# Patient Record
Sex: FEMALE | Race: WHITE | NOT HISPANIC OR LATINO | Employment: UNEMPLOYED | URBAN - METROPOLITAN AREA
[De-identification: names, ages, dates, MRNs, and addresses within clinical notes are randomized per-mention and may not be internally consistent; named-entity substitution may affect disease eponyms.]

---

## 2018-11-07 LAB
EXTERNAL ABO GROUPING: NORMAL
EXTERNAL ANTIBODY SCREEN: NEGATIVE
EXTERNAL HEPATITIS B SURFACE ANTIGEN: NEGATIVE
EXTERNAL HEPATITIS C AB: NORMAL
EXTERNAL RH FACTOR: POSITIVE
EXTERNAL RUBELLA QUALITATIVE: NORMAL
EXTERNAL SYPHILIS RPR SCREEN: NEGATIVE
HIV1 P24 AG SERPL QL IA: NORMAL

## 2019-06-06 LAB
EXTERNAL GROUP B STREP ANTIGEN: NEGATIVE
EXTERNAL GROUP B STREP ANTIGEN: NEGATIVE

## 2019-07-02 ENCOUNTER — HOSPITAL ENCOUNTER (INPATIENT)
Facility: HOSPITAL | Age: 24
LOS: 3 days | Discharge: HOME | End: 2019-07-05
Attending: OBSTETRICS & GYNECOLOGY | Admitting: OBSTETRICS & GYNECOLOGY

## 2019-07-02 PROBLEM — Z34.90 PREGNANCY: Status: ACTIVE | Noted: 2019-07-02

## 2019-07-02 LAB
ABO GROUP BLD: NORMAL
BASOPHILS # BLD AUTO: 0.04 10*3/MM3 (ref 0–0.2)
BASOPHILS NFR BLD AUTO: 0.3 % (ref 0–1.5)
BLD GP AB SCN SERPL QL: NEGATIVE
DEPRECATED RDW RBC AUTO: 44.2 FL (ref 37–54)
EOSINOPHIL # BLD AUTO: 0.1 10*3/MM3 (ref 0–0.4)
EOSINOPHIL NFR BLD AUTO: 0.9 % (ref 0.3–6.2)
ERYTHROCYTE [DISTWIDTH] IN BLOOD BY AUTOMATED COUNT: 13 % (ref 12.3–15.4)
HCT VFR BLD AUTO: 37.2 % (ref 34–46.6)
HGB BLD-MCNC: 12 G/DL (ref 12–15.9)
IMM GRANULOCYTES # BLD AUTO: 0.11 10*3/MM3 (ref 0–0.05)
IMM GRANULOCYTES NFR BLD AUTO: 0.9 % (ref 0–0.5)
LYMPHOCYTES # BLD AUTO: 2.14 10*3/MM3 (ref 0.7–3.1)
LYMPHOCYTES NFR BLD AUTO: 18.4 % (ref 19.6–45.3)
MCH RBC QN AUTO: 30.2 PG (ref 26.6–33)
MCHC RBC AUTO-ENTMCNC: 32.3 G/DL (ref 31.5–35.7)
MCV RBC AUTO: 93.5 FL (ref 79–97)
MONOCYTES # BLD AUTO: 0.6 10*3/MM3 (ref 0.1–0.9)
MONOCYTES NFR BLD AUTO: 5.2 % (ref 5–12)
NEUTROPHILS # BLD AUTO: 8.62 10*3/MM3 (ref 1.7–7)
NEUTROPHILS NFR BLD AUTO: 74.3 % (ref 42.7–76)
NRBC BLD AUTO-RTO: 0.1 /100 WBC (ref 0–0.2)
PLATELET # BLD AUTO: 150 10*3/MM3 (ref 140–450)
PMV BLD AUTO: 9.9 FL (ref 6–12)
RBC # BLD AUTO: 3.98 10*6/MM3 (ref 3.77–5.28)
RH BLD: POSITIVE
T&S EXPIRATION DATE: NORMAL
WBC NRBC COR # BLD: 11.61 10*3/MM3 (ref 3.4–10.8)

## 2019-07-02 PROCEDURE — 85025 COMPLETE CBC W/AUTO DIFF WBC: CPT | Performed by: OBSTETRICS & GYNECOLOGY

## 2019-07-02 PROCEDURE — 86901 BLOOD TYPING SEROLOGIC RH(D): CPT | Performed by: OBSTETRICS & GYNECOLOGY

## 2019-07-02 PROCEDURE — 86850 RBC ANTIBODY SCREEN: CPT | Performed by: OBSTETRICS & GYNECOLOGY

## 2019-07-02 PROCEDURE — 86900 BLOOD TYPING SEROLOGIC ABO: CPT | Performed by: OBSTETRICS & GYNECOLOGY

## 2019-07-02 PROCEDURE — 3E0P7VZ INTRODUCTION OF HORMONE INTO FEMALE REPRODUCTIVE, VIA NATURAL OR ARTIFICIAL OPENING: ICD-10-PCS | Performed by: OBSTETRICS & GYNECOLOGY

## 2019-07-02 RX ORDER — SODIUM CHLORIDE 0.9 % (FLUSH) 0.9 %
3 SYRINGE (ML) INJECTION EVERY 12 HOURS SCHEDULED
Status: DISCONTINUED | OUTPATIENT
Start: 2019-07-02 | End: 2019-07-03 | Stop reason: HOSPADM

## 2019-07-02 RX ORDER — OXYTOCIN-SODIUM CHLORIDE 0.9% IV SOLN 30 UNIT/500ML 30-0.9/5 UT/ML-%
2-30 SOLUTION INTRAVENOUS
Status: DISCONTINUED | OUTPATIENT
Start: 2019-07-02 | End: 2019-07-02

## 2019-07-02 RX ORDER — SODIUM CHLORIDE, SODIUM LACTATE, POTASSIUM CHLORIDE, CALCIUM CHLORIDE 600; 310; 30; 20 MG/100ML; MG/100ML; MG/100ML; MG/100ML
125 INJECTION, SOLUTION INTRAVENOUS CONTINUOUS
Status: DISCONTINUED | OUTPATIENT
Start: 2019-07-02 | End: 2019-07-03

## 2019-07-02 RX ORDER — LIDOCAINE HYDROCHLORIDE 10 MG/ML
5 INJECTION, SOLUTION EPIDURAL; INFILTRATION; INTRACAUDAL; PERINEURAL AS NEEDED
Status: DISCONTINUED | OUTPATIENT
Start: 2019-07-02 | End: 2019-07-03 | Stop reason: HOSPADM

## 2019-07-02 RX ORDER — FAMOTIDINE 10 MG/ML
20 INJECTION, SOLUTION INTRAVENOUS EVERY 12 HOURS SCHEDULED
Status: DISCONTINUED | OUTPATIENT
Start: 2019-07-02 | End: 2019-07-03 | Stop reason: HOSPADM

## 2019-07-02 RX ORDER — FAMOTIDINE 20 MG/1
20 TABLET, FILM COATED ORAL EVERY 12 HOURS SCHEDULED
Status: DISCONTINUED | OUTPATIENT
Start: 2019-07-02 | End: 2019-07-03 | Stop reason: HOSPADM

## 2019-07-02 RX ORDER — PRENATAL VIT NO.126/IRON/FOLIC 28MG-0.8MG
TABLET ORAL DAILY
COMMUNITY

## 2019-07-02 RX ORDER — OXYTOCIN-SODIUM CHLORIDE 0.9% IV SOLN 30 UNIT/500ML 30-0.9/5 UT/ML-%
2-20 SOLUTION INTRAVENOUS
Status: DISCONTINUED | OUTPATIENT
Start: 2019-07-03 | End: 2019-07-03

## 2019-07-02 RX ORDER — SODIUM CHLORIDE 0.9 % (FLUSH) 0.9 %
3-10 SYRINGE (ML) INJECTION AS NEEDED
Status: DISCONTINUED | OUTPATIENT
Start: 2019-07-02 | End: 2019-07-03 | Stop reason: HOSPADM

## 2019-07-02 RX ADMIN — SODIUM CHLORIDE, POTASSIUM CHLORIDE, SODIUM LACTATE AND CALCIUM CHLORIDE 75 ML/HR: 600; 310; 30; 20 INJECTION, SOLUTION INTRAVENOUS at 19:15

## 2019-07-02 RX ADMIN — DINOPROSTONE 10 MG: 10 INSERT VAGINAL at 11:15

## 2019-07-02 RX ADMIN — SODIUM CHLORIDE, POTASSIUM CHLORIDE, SODIUM LACTATE AND CALCIUM CHLORIDE 125 ML/HR: 600; 310; 30; 20 INJECTION, SOLUTION INTRAVENOUS at 10:25

## 2019-07-02 NOTE — PLAN OF CARE
Problem: Patient Care Overview  Goal: Plan of Care Review  Outcome: Ongoing (interventions implemented as appropriate)   07/02/19 1713   Coping/Psychosocial   Plan of Care Reviewed With patient   Plan of Care Review   Progress no change   OTHER   Outcome Summary patient sent down from office for 4/8 on BPP. Pt had cervidil placed at 1115. Pt resting comfortably. Plan to remove cervidil at 12 hours and start pitocin      Goal: Individualization and Mutuality  Outcome: Ongoing (interventions implemented as appropriate)   07/02/19 1713   Individualization   Patient Specific Preferences Healthy mom and baby   Patient Specific Goals (Include Timeframe) RAMSEY, vaginal delivery, breastfeed   Patient Specific Interventions cervidil for 12 hours, pitocin to follow   Mutuality/Individual Preferences   What Anxieties, Fears, Concerns, or Questions Do You Have About Your Care? first baby, pt states she is excited and nervous about what to expect   How Would You and/or Your Support Person Like to Participate in Your Care?  is shakir     Goal: Discharge Needs Assessment  Outcome: Ongoing (interventions implemented as appropriate)   07/02/19 1713   Discharge Needs Assessment   Readmission Within the Last 30 Days no previous admission in last 30 days       Problem: Labor (Cervical Ripen, Induct, Augment) (Adult,Obstetrics,Pediatric)  Goal: Signs and Symptoms of Listed Potential Problems Will be Absent, Minimized or Managed (Labor)  Outcome: Ongoing (interventions implemented as appropriate)   07/02/19 1713   Goal/Outcome Evaluation   Problems Assessed (Labor) all   Problems Present (Labor) none

## 2019-07-02 NOTE — NURSING NOTE
Dr. Kavita Yeager visualized deceleration. O2 on the patient, bolus going. Will pull cervidil for any subsequent decelerations.

## 2019-07-02 NOTE — NURSING NOTE
Spoke to Dr. Yeager. Updated on patient and why she is here. Pt sent over from office for 4/8 on BPP to be induced. Discussed SVE. MD states she wants to start cervical ripening. Cervidil to be placed now and removed in 12 hours unless labor starts before. Ctx every 2-5, pt unaware of contractions. Ctx palpate mild.

## 2019-07-03 ENCOUNTER — ANESTHESIA EVENT (OUTPATIENT)
Dept: LABOR AND DELIVERY | Facility: HOSPITAL | Age: 24
End: 2019-07-03

## 2019-07-03 ENCOUNTER — ANESTHESIA (OUTPATIENT)
Dept: LABOR AND DELIVERY | Facility: HOSPITAL | Age: 24
End: 2019-07-03

## 2019-07-03 PROCEDURE — 3E033VJ INTRODUCTION OF OTHER HORMONE INTO PERIPHERAL VEIN, PERCUTANEOUS APPROACH: ICD-10-PCS | Performed by: OBSTETRICS & GYNECOLOGY

## 2019-07-03 PROCEDURE — 10907ZC DRAINAGE OF AMNIOTIC FLUID, THERAPEUTIC FROM PRODUCTS OF CONCEPTION, VIA NATURAL OR ARTIFICIAL OPENING: ICD-10-PCS | Performed by: OBSTETRICS & GYNECOLOGY

## 2019-07-03 PROCEDURE — 25010000002 ROPIVACAINE PER 1 MG: Performed by: ANESTHESIOLOGY

## 2019-07-03 PROCEDURE — C1755 CATHETER, INTRASPINAL: HCPCS

## 2019-07-03 PROCEDURE — C1755 CATHETER, INTRASPINAL: HCPCS | Performed by: ANESTHESIOLOGY

## 2019-07-03 PROCEDURE — 0KQM0ZZ REPAIR PERINEUM MUSCLE, OPEN APPROACH: ICD-10-PCS | Performed by: OBSTETRICS & GYNECOLOGY

## 2019-07-03 RX ORDER — IBUPROFEN 800 MG/1
800 TABLET ORAL EVERY 8 HOURS PRN
Status: DISCONTINUED | OUTPATIENT
Start: 2019-07-03 | End: 2019-07-05 | Stop reason: HOSPADM

## 2019-07-03 RX ORDER — ONDANSETRON 2 MG/ML
4 INJECTION INTRAMUSCULAR; INTRAVENOUS ONCE AS NEEDED
Status: DISCONTINUED | OUTPATIENT
Start: 2019-07-03 | End: 2019-07-03 | Stop reason: HOSPADM

## 2019-07-03 RX ORDER — DIPHENHYDRAMINE HCL 25 MG
25 CAPSULE ORAL EVERY 6 HOURS PRN
Status: DISCONTINUED | OUTPATIENT
Start: 2019-07-03 | End: 2019-07-03 | Stop reason: HOSPADM

## 2019-07-03 RX ORDER — PHYTONADIONE 1 MG/.5ML
INJECTION, EMULSION INTRAMUSCULAR; INTRAVENOUS; SUBCUTANEOUS
Status: DISPENSED
Start: 2019-07-03 | End: 2019-07-04

## 2019-07-03 RX ORDER — ACETAMINOPHEN 325 MG/1
650 TABLET ORAL EVERY 6 HOURS PRN
Status: DISCONTINUED | OUTPATIENT
Start: 2019-07-03 | End: 2019-07-03

## 2019-07-03 RX ORDER — LANOLIN
CREAM (ML) TOPICAL
Status: DISCONTINUED | OUTPATIENT
Start: 2019-07-03 | End: 2019-07-05 | Stop reason: HOSPADM

## 2019-07-03 RX ORDER — PROMETHAZINE HYDROCHLORIDE 25 MG/ML
12.5 INJECTION, SOLUTION INTRAMUSCULAR; INTRAVENOUS EVERY 6 HOURS PRN
Status: DISCONTINUED | OUTPATIENT
Start: 2019-07-03 | End: 2019-07-05 | Stop reason: HOSPADM

## 2019-07-03 RX ORDER — OXYTOCIN-SODIUM CHLORIDE 0.9% IV SOLN 30 UNIT/500ML 30-0.9/5 UT/ML-%
250 SOLUTION INTRAVENOUS CONTINUOUS
Status: DISPENSED | OUTPATIENT
Start: 2019-07-03 | End: 2019-07-03

## 2019-07-03 RX ORDER — DOCUSATE SODIUM 100 MG/1
100 CAPSULE, LIQUID FILLED ORAL 2 TIMES DAILY
Status: DISCONTINUED | OUTPATIENT
Start: 2019-07-03 | End: 2019-07-05 | Stop reason: HOSPADM

## 2019-07-03 RX ORDER — OXYCODONE AND ACETAMINOPHEN 10; 325 MG/1; MG/1
1 TABLET ORAL EVERY 4 HOURS PRN
Status: DISCONTINUED | OUTPATIENT
Start: 2019-07-03 | End: 2019-07-05 | Stop reason: HOSPADM

## 2019-07-03 RX ORDER — OXYCODONE HYDROCHLORIDE AND ACETAMINOPHEN 5; 325 MG/1; MG/1
1 TABLET ORAL EVERY 4 HOURS PRN
Status: DISCONTINUED | OUTPATIENT
Start: 2019-07-03 | End: 2019-07-05 | Stop reason: HOSPADM

## 2019-07-03 RX ORDER — METHYLERGONOVINE MALEATE 0.2 MG/ML
200 INJECTION INTRAVENOUS ONCE AS NEEDED
Status: DISCONTINUED | OUTPATIENT
Start: 2019-07-03 | End: 2019-07-03 | Stop reason: HOSPADM

## 2019-07-03 RX ORDER — ONDANSETRON 2 MG/ML
4 INJECTION INTRAMUSCULAR; INTRAVENOUS EVERY 8 HOURS PRN
Status: DISCONTINUED | OUTPATIENT
Start: 2019-07-03 | End: 2019-07-05 | Stop reason: HOSPADM

## 2019-07-03 RX ORDER — CARBOPROST TROMETHAMINE 250 UG/ML
250 INJECTION, SOLUTION INTRAMUSCULAR AS NEEDED
Status: DISCONTINUED | OUTPATIENT
Start: 2019-07-03 | End: 2019-07-03 | Stop reason: HOSPADM

## 2019-07-03 RX ORDER — EPHEDRINE SULFATE 50 MG/ML
5 INJECTION, SOLUTION INTRAVENOUS AS NEEDED
Status: DISCONTINUED | OUTPATIENT
Start: 2019-07-03 | End: 2019-07-03 | Stop reason: HOSPADM

## 2019-07-03 RX ORDER — FAMOTIDINE 10 MG/ML
20 INJECTION, SOLUTION INTRAVENOUS ONCE AS NEEDED
Status: DISCONTINUED | OUTPATIENT
Start: 2019-07-03 | End: 2019-07-03 | Stop reason: HOSPADM

## 2019-07-03 RX ORDER — ERYTHROMYCIN 5 MG/G
OINTMENT OPHTHALMIC
Status: DISPENSED
Start: 2019-07-03 | End: 2019-07-04

## 2019-07-03 RX ORDER — PROMETHAZINE HYDROCHLORIDE 25 MG/1
25 TABLET ORAL EVERY 6 HOURS PRN
Status: DISCONTINUED | OUTPATIENT
Start: 2019-07-03 | End: 2019-07-05 | Stop reason: HOSPADM

## 2019-07-03 RX ORDER — OXYTOCIN-SODIUM CHLORIDE 0.9% IV SOLN 30 UNIT/500ML 30-0.9/5 UT/ML-%
125 SOLUTION INTRAVENOUS CONTINUOUS PRN
Status: COMPLETED | OUTPATIENT
Start: 2019-07-03 | End: 2019-07-03

## 2019-07-03 RX ORDER — OXYTOCIN-SODIUM CHLORIDE 0.9% IV SOLN 30 UNIT/500ML 30-0.9/5 UT/ML-%
999 SOLUTION INTRAVENOUS ONCE
Status: COMPLETED | OUTPATIENT
Start: 2019-07-03 | End: 2019-07-03

## 2019-07-03 RX ORDER — ONDANSETRON 4 MG/1
4 TABLET, FILM COATED ORAL EVERY 8 HOURS PRN
Status: DISCONTINUED | OUTPATIENT
Start: 2019-07-03 | End: 2019-07-05 | Stop reason: HOSPADM

## 2019-07-03 RX ORDER — MISOPROSTOL 200 UG/1
800 TABLET ORAL AS NEEDED
Status: DISCONTINUED | OUTPATIENT
Start: 2019-07-03 | End: 2019-07-03 | Stop reason: HOSPADM

## 2019-07-03 RX ORDER — MISOPROSTOL 200 UG/1
600 TABLET ORAL ONCE AS NEEDED
Status: DISCONTINUED | OUTPATIENT
Start: 2019-07-03 | End: 2019-07-05 | Stop reason: HOSPADM

## 2019-07-03 RX ORDER — PROMETHAZINE HYDROCHLORIDE 25 MG/1
12.5 SUPPOSITORY RECTAL EVERY 6 HOURS PRN
Status: DISCONTINUED | OUTPATIENT
Start: 2019-07-03 | End: 2019-07-05 | Stop reason: HOSPADM

## 2019-07-03 RX ORDER — HYDROXYZINE 50 MG/1
50 TABLET, FILM COATED ORAL NIGHTLY PRN
Status: DISCONTINUED | OUTPATIENT
Start: 2019-07-03 | End: 2019-07-05 | Stop reason: HOSPADM

## 2019-07-03 RX ORDER — ROPIVACAINE HYDROCHLORIDE 2 MG/ML
INJECTION, SOLUTION EPIDURAL; INFILTRATION; PERINEURAL AS NEEDED
Status: DISCONTINUED | OUTPATIENT
Start: 2019-07-03 | End: 2019-07-03 | Stop reason: SURG

## 2019-07-03 RX ORDER — CALCIUM CARBONATE 200(500)MG
2 TABLET,CHEWABLE ORAL 3 TIMES DAILY PRN
Status: DISCONTINUED | OUTPATIENT
Start: 2019-07-03 | End: 2019-07-05 | Stop reason: HOSPADM

## 2019-07-03 RX ORDER — LIDOCAINE HYDROCHLORIDE AND EPINEPHRINE 15; 5 MG/ML; UG/ML
INJECTION, SOLUTION EPIDURAL AS NEEDED
Status: DISCONTINUED | OUTPATIENT
Start: 2019-07-03 | End: 2019-07-03 | Stop reason: SURG

## 2019-07-03 RX ADMIN — OXYTOCIN 125 ML/HR: 10 INJECTION, SOLUTION INTRAMUSCULAR; INTRAVENOUS at 18:55

## 2019-07-03 RX ADMIN — Medication: at 20:29

## 2019-07-03 RX ADMIN — SODIUM CHLORIDE, POTASSIUM CHLORIDE, SODIUM LACTATE AND CALCIUM CHLORIDE 125 ML/HR: 600; 310; 30; 20 INJECTION, SOLUTION INTRAVENOUS at 14:36

## 2019-07-03 RX ADMIN — ROPIVACAINE HYDROCHLORIDE 10 ML: 2 INJECTION, SOLUTION EPIDURAL; INFILTRATION at 10:45

## 2019-07-03 RX ADMIN — Medication 10 ML/HR: at 10:48

## 2019-07-03 RX ADMIN — LIDOCAINE HYDROCHLORIDE AND EPINEPHRINE 3 ML: 15; 5 INJECTION, SOLUTION EPIDURAL at 10:40

## 2019-07-03 RX ADMIN — OXYTOCIN 999 ML/HR: 10 INJECTION INTRAVENOUS at 17:37

## 2019-07-03 RX ADMIN — SODIUM CHLORIDE, POTASSIUM CHLORIDE, SODIUM LACTATE AND CALCIUM CHLORIDE 999 ML/HR: 600; 310; 30; 20 INJECTION, SOLUTION INTRAVENOUS at 09:29

## 2019-07-03 RX ADMIN — ACETAMINOPHEN 650 MG: 325 TABLET, FILM COATED ORAL at 07:08

## 2019-07-03 RX ADMIN — SODIUM CHLORIDE, POTASSIUM CHLORIDE, SODIUM LACTATE AND CALCIUM CHLORIDE 125 ML/HR: 600; 310; 30; 20 INJECTION, SOLUTION INTRAVENOUS at 04:48

## 2019-07-03 RX ADMIN — IBUPROFEN 800 MG: 800 TABLET, FILM COATED ORAL at 20:29

## 2019-07-03 RX ADMIN — OXYTOCIN 2 MILLI-UNITS/MIN: 10 INJECTION INTRAVENOUS at 02:00

## 2019-07-03 RX ADMIN — DOCUSATE SODIUM 100 MG: 100 CAPSULE, LIQUID FILLED ORAL at 20:29

## 2019-07-03 RX ADMIN — Medication: at 20:28

## 2019-07-03 NOTE — ANESTHESIA PREPROCEDURE EVALUATION
Anesthesia Evaluation     Patient summary reviewed and Nursing notes reviewed                Airway   Mallampati: III  TM distance: >3 FB  Neck ROM: full  No difficulty expected  Dental - normal exam     Pulmonary - negative pulmonary ROS and normal exam   Cardiovascular - negative cardio ROS and normal exam        Neuro/Psych- negative ROS  GI/Hepatic/Renal/Endo - negative ROS     Musculoskeletal (-) negative ROS    Abdominal  - normal exam    Bowel sounds: normal.   Substance History - negative use     OB/GYN    (+) Pregnant,         Other                        Anesthesia Plan    ASA 2     epidural     Anesthetic plan, all risks, benefits, and alternatives have been provided, discussed and informed consent has been obtained with: patient.

## 2019-07-03 NOTE — PLAN OF CARE
Problem: Patient Care Overview  Goal: Plan of Care Review   07/03/19 0439   Coping/Psychosocial   Plan of Care Reviewed With patient;spouse   Plan of Care Review   Progress improving   OTHER   Outcome Summary Pt currently on pitocin, c/o pain rating it 2 (0-10) but tolerable, pt states she is coping at this time, spouse at bedside, all needs met per staff, no s/s of distress noted or voiced.

## 2019-07-03 NOTE — PLAN OF CARE
Problem: Patient Care Overview  Goal: Plan of Care Review  Outcome: Ongoing (interventions implemented as appropriate)   19   Coping/Psychosocial   Plan of Care Reviewed With patient;spouse   Plan of Care Review   Progress improving   OTHER   Outcome Summary  of viable baby girl. Apgars 9/9. Infant skin to skin. Fundus firm, midline, 2 below umbilicus, with scant amt of bleeding. Will transfer to postpartum when discharge criteria met     Goal: Individualization and Mutuality  Outcome: Ongoing (interventions implemented as appropriate)   19   Individualization   Patient Specific Preferences --  Breastfeeding and RAMSEY   Patient Specific Goals (Include Timeframe) --  successful breastfeeding before delivery   Patient Specific Interventions --  breastfeeding, and RAMSEY   Mutuality/Individual Preferences   What Anxieties, Fears, Concerns, or Questions Do You Have About Your Care? None at this time --    What Information Would Help Us Give You More Personalized Care? Keep updated with POC --    How Would You and/or Your Support Person Like to Participate in Your Care? RAMSEY, Breastfeed, bonding with  as much as possible --    Mutuality/Individual Preferences   How to Address Anxieties/Fears Keep updated with POC --      Goal: Discharge Needs Assessment  Outcome: Ongoing (interventions implemented as appropriate)   19   Discharge Needs Assessment   Readmission Within the Last 30 Days --  no previous admission in last 30 days   Concerns to be Addressed no discharge needs identified --    Patient/Family Anticipates Transition to home --    Patient/Family Anticipated Services at Transition none --    Transportation Concerns car, none --    Transportation Anticipated family or friend will provide --    Anticipated Changes Related to Illness none --    Equipment Needed After Discharge none --    Offered/Gave Vendor List no --    Disability   Equipment Currently  Used at Home none --        Problem: Fall Risk,  (Adult,Obstetrics,Pediatric)  Goal: Identify Related Risk Factors and Signs and Symptoms  Outcome: Ongoing (interventions implemented as appropriate)   19 1152   Fall Risk,  (Adult,Obstetrics,Pediatric)   Related Risk Factors (Fall Risk, ) regional anesthesia   Signs and Symptoms (Fall Risk, ) presence of fall risk factors     Goal: Absence of Maternal Fall  Outcome: Ongoing (interventions implemented as appropriate)   19 1152   Fall Risk,  (Adult,Obstetrics,Pediatric)   Absence of Maternal Fall making progress toward outcome       Problem: Anesthesia/Analgesia, Neuraxial (Obstetrics)  Goal: Signs and Symptoms of Listed Potential Problems Will be Absent, Minimized or Managed (Anesthesia/Analgesia, Neuraxial)  Outcome: Ongoing (interventions implemented as appropriate)   19 180   Goal/Outcome Evaluation   Problems Assessed (Neuraxial Anesthesia/Analgesia, OB) all   Problems Present (Neuraxial Anesth OB) none       Problem: Skin Injury Risk (Adult)  Goal: Identify Related Risk Factors and Signs and Symptoms  Outcome: Ongoing (interventions implemented as appropriate)   19 1152   Skin Injury Risk (Adult)   Related Risk Factors (Skin Injury Risk) medication;mobility impaired     Goal: Skin Health and Integrity  Outcome: Ongoing (interventions implemented as appropriate)   19 570   Skin Injury Risk (Adult)   Skin Health and Integrity making progress toward outcome

## 2019-07-03 NOTE — PLAN OF CARE
Problem: Patient Care Overview  Goal: Plan of Care Review  Outcome: Ongoing (interventions implemented as appropriate)   19 1152   Coping/Psychosocial   Plan of Care Reviewed With patient;spouse   Plan of Care Review   Progress improving   OTHER   Outcome Summary Pt comfortable with epidural. Pitocin infusing at 11mu/min. Category 1 FHR strip. Will continue with curent POC for      Goal: Individualization and Mutuality  Outcome: Ongoing (interventions implemented as appropriate)   19 0434   Individualization   Patient Specific Preferences Epidural, Breastfeed, RAMSEY   Patient Specific Goals (Include Timeframe) Vaginal delivery, effetive pain control while in labor   Patient Specific Interventions Epidural    Mutuality/Individual Preferences   What Anxieties, Fears, Concerns, or Questions Do You Have About Your Care? None at this time   What Information Would Help Us Give You More Personalized Care? Keep updated with POC   How Would You and/or Your Support Person Like to Participate in Your Care? RAMSEY, Breastfeed, bonding with  as much as possible   Mutuality/Individual Preferences   How to Address Anxieties/Fears Keep updated with POC     Goal: Discharge Needs Assessment  Outcome: Ongoing (interventions implemented as appropriate)   19 0434   Discharge Needs Assessment   Readmission Within the Last 30 Days no previous admission in last 30 days   Concerns to be Addressed no discharge needs identified   Patient/Family Anticipates Transition to home   Patient/Family Anticipated Services at Transition none   Transportation Concerns car, none   Transportation Anticipated family or friend will provide   Anticipated Changes Related to Illness none   Equipment Needed After Discharge none   Offered/Gave Vendor List no   Disability   Equipment Currently Used at Home none       Problem: Labor (Cervical Ripen, Induct, Augment) (Adult,Obstetrics,Pediatric)  Goal: Signs and Symptoms of Listed Potential  Problems Will be Absent, Minimized or Managed (Labor)  Outcome: Ongoing (interventions implemented as appropriate)   19 1152   Goal/Outcome Evaluation   Problems Assessed (Labor) all   Problems Present (Labor) none       Problem: Fall Risk,  (Adult,Obstetrics,Pediatric)  Goal: Identify Related Risk Factors and Signs and Symptoms  Outcome: Ongoing (interventions implemented as appropriate)   19 1152   Fall Risk,  (Adult,Obstetrics,Pediatric)   Related Risk Factors (Fall Risk, ) regional anesthesia   Signs and Symptoms (Fall Risk, ) presence of fall risk factors     Goal: Absence of Maternal Fall  Outcome: Ongoing (interventions implemented as appropriate)   19 1152   Fall Risk,  (Adult,Obstetrics,Pediatric)   Absence of Maternal Fall making progress toward outcome       Problem: Anesthesia/Analgesia, Neuraxial (Obstetrics)  Goal: Signs and Symptoms of Listed Potential Problems Will be Absent, Minimized or Managed (Anesthesia/Analgesia, Neuraxial)  Outcome: Ongoing (interventions implemented as appropriate)   19 1152   Goal/Outcome Evaluation   Problems Assessed (Neuraxial Anesthesia/Analgesia, OB) all   Problems Present (Neuraxial Anesth OB) none       Problem: Skin Injury Risk (Adult)  Goal: Identify Related Risk Factors and Signs and Symptoms  Outcome: Ongoing (interventions implemented as appropriate)   19 1152   Skin Injury Risk (Adult)   Related Risk Factors (Skin Injury Risk) medication;mobility impaired     Goal: Skin Health and Integrity  Outcome: Ongoing (interventions implemented as appropriate)   19 1152   Skin Injury Risk (Adult)   Skin Health and Integrity making progress toward outcome

## 2019-07-03 NOTE — ANESTHESIA PROCEDURE NOTES
Labor Epidural      Patient location during procedure: OB  Performed By  Anesthesiologist: Zeina Cotton MD  Preanesthetic Checklist  Completed: patient identified, site marked, surgical consent, pre-op evaluation, timeout performed, IV checked, risks and benefits discussed and monitors and equipment checked  Prep:  Pt Position:sitting  Sterile Tech:gloves, cap and sterile barrier  Monitoring:continuous pulse oximetry, EKG and blood pressure monitoring  Epidural Block Procedure:  Approach:midline  Location:L3-L4  Needle Type:Tuohy  Needle Gauge:18 G  Loss of Resistance Medium: air  Aspiration:negative  Test Dose:negative  Number of Attempts: 2  Post Assessment:  Dressing:secured with tape and occlusive dressing applied  Pt Tolerance:patient tolerated the procedure well with no apparent complications  Complications:no

## 2019-07-03 NOTE — H&P
[facility]  Obstetric History and Physical    Patient Name: Josue Rothman  :  1995  MRN:  [id number]      Chief Complaint   Patient presents with   • IOL     4/8 BPP, sent from office, positive fetal movement, no vaginal bleeding or LOF       Subjective     Patient is a 23 y.o. female  currently at 40w0d, who presents for IOL from the office.            Objective       Vital Signs Range for the last 24 hours  Temperature: Temp:  [97.9 °F (36.6 °C)-98.7 °F (37.1 °C)] 98.3 °F (36.8 °C)   Temp Source: Temp src: Oral   BP: BP: (100-134)/(61-84) 116/69   Pulse: Heart Rate:  [64-91] 86   Respirations: Resp:  [16-18] 18                   Physical Examination:     General :  Alert in NAD  Abdomen: Gravid, NT    Presentation: vtx   Cervix: Exam by: Method: sterile exam per RN   Dilation: Cervical Dilation (cm): 1   Effacement: Cervical Effacement: 50-60%   Station: Fetal Station: -2       Fetal Heart Rate Assessment   Method: Fetal HR Assessment Method: external   Beats/min: Fetal HR (beats/min): 110   Baseline: Fetal Heart Baseline Rate: normal range   Varibility: Fetal HR Variability: moderate (amplitude range 6 to 25 bpm)   Accels: Fetal HR Accelerations: greater than/equal to 15 bpm, lasting at least 15 seconds   Decels: Fetal HR Decelerations: absent   Tracing Category:       Uterine Assessment   Method: Method: external tocotransducer, palpation   Frequency (min): Contraction Frequency (Minutes): 2-5   Ctx Count in 10 min:     Duration:     Intensity: Contraction Intensity: moderate by palpation   Intensity by IUPC:     Resting Tone: Uterine Resting Tone: soft by palpation   Resting Tone by IUPC:     Herriman Units:           Results from last 7 days   Lab Units 19  1034   WBC 10*3/mm3 11.61*   HEMOGLOBIN g/dL 12.0   HEMATOCRIT % 37.2   PLATELETS 10*3/mm3 150       Assessment/Plan     1.  Intrauterine pregnancy at 40w0d weeks gestation for IOL at term with BPP 4/8   reactive fetal status.     Plan cervical rippening with Cervidil followed by Pitocin. Anticipate .  Plan of care has been reviewed with patient and family.  All questions answered.      Pregnancy        H&P updated. The patient was examined and the following changes are noted above.         Kavita Yeager MD  2019  11:18 PM

## 2019-07-03 NOTE — PLAN OF CARE
Problem: Patient Care Overview  Goal: Discharge Needs Assessment  Outcome: Ongoing (interventions implemented as appropriate)   19   Discharge Needs Assessment   Readmission Within the Last 30 Days no previous admission in last 30 days   Concerns to be Addressed no discharge needs identified   Patient/Family Anticipates Transition to home   Patient/Family Anticipated Services at Transition none   Transportation Concerns car, none   Transportation Anticipated family or friend will provide   Anticipated Changes Related to Illness none   Equipment Needed After Discharge none   Offered/Gave Vendor List no   Disability   Equipment Currently Used at Home none     Goal: Interprofessional Rounds/Family Conf  Outcome: Ongoing (interventions implemented as appropriate)   19   Interdisciplinary Rounds/Family Conf   Summary discussed POC, medication, and anticipated care of    Participants patient;nursing       Problem: Labor (Cervical Ripen, Induct, Augment) (Adult,Obstetrics,Pediatric)  Goal: Signs and Symptoms of Listed Potential Problems Will be Absent, Minimized or Managed (Labor)  Outcome: Ongoing (interventions implemented as appropriate)   19   Goal/Outcome Evaluation   Problems Assessed (Labor) all   Problems Present (Labor) none

## 2019-07-03 NOTE — PLAN OF CARE
Problem: Patient Care Overview  Goal: Individualization and Mutuality  Outcome: Ongoing (interventions implemented as appropriate)   19 0434   Individualization   Patient Specific Preferences Epidural, Breastfeed, RAMSEY   Patient Specific Goals (Include Timeframe) Vaginal delivery, effetive pain control while in labor   Patient Specific Interventions Epidural    Mutuality/Individual Preferences   What Anxieties, Fears, Concerns, or Questions Do You Have About Your Care? None at this time   What Information Would Help Us Give You More Personalized Care? Keep updated with POC   How Would You and/or Your Support Person Like to Participate in Your Care? RAMSEY, Breastfeed, bonding with  as much as possible   Mutuality/Individual Preferences   How to Address Anxieties/Fears Keep updated with POC

## 2019-07-03 NOTE — NURSING NOTE
Late Entry:    1839 - Pt on bedpan trying to void. Pt unable to void at this time. Perineum cleansed, witch hazel pads and safia-pad placed on perineum.

## 2019-07-03 NOTE — L&D DELIVERY NOTE
[facility]  Vaginal Delivery Note    Patient Name: Josue Rothman  :  1995  MRN:  [id number]      Delivery     Delivery: Vaginal, Spontaneous     YOB: 2020    Time of Birth: 5:34 PM      Anesthesia: Epidural     Delivering clinician: Amanda Bowen MD       Infant    Findings: Viable female  infant    Infant observations: Weight: 3746 g (8 lb 4.1 oz)   Observations/Comments:  scale 4      Apgars: 9   @ 1 minute /    9   @ 5 minutes     Placenta, Cord, and Fluid    Placenta delivered  Expressed   at  7/3/2019  5:37 PM     Cord: 3 vessels  present.   Cord blood obtained: Yes    Cord gases obtained:  No      Repair    Episiotomy: No   Lacerations: Second degree   Estimated Blood Loss: 300  mls.       Delivery narrative: The patient is a 23 y.o.  at 40w1d.  Presented for bpp .  She underwent cervidil and then pitocin without difficulty. arom with clear fluid performed at 2cm. Epidural given and worked well throughout. Baby tolerated labor wonderfully. Progressed normally to C/C/+1. . Fetal status reassuring throughout.  of viable female infant  @ 5:34 PM  over an intact perineum. ASDE. 3746 g (8 lb 4.1 oz) .  Placenta delivered spontaneously, intact with 3 vessel cord. Cervix and rectum intact. second degree laceration repaired in usual fashion with 3-0vicryl suture. Mother and baby recovering good condition.       Amanda Bowen MD  19  7:21 PM

## 2019-07-04 LAB
BASOPHILS # BLD AUTO: 0.04 10*3/MM3 (ref 0–0.2)
BASOPHILS NFR BLD AUTO: 0.3 % (ref 0–1.5)
DEPRECATED RDW RBC AUTO: 42.9 FL (ref 37–54)
EOSINOPHIL # BLD AUTO: 0.08 10*3/MM3 (ref 0–0.4)
EOSINOPHIL NFR BLD AUTO: 0.5 % (ref 0.3–6.2)
ERYTHROCYTE [DISTWIDTH] IN BLOOD BY AUTOMATED COUNT: 12.9 % (ref 12.3–15.4)
HCT VFR BLD AUTO: 32 % (ref 34–46.6)
HGB BLD-MCNC: 10.5 G/DL (ref 12–15.9)
IMM GRANULOCYTES # BLD AUTO: 0.11 10*3/MM3 (ref 0–0.05)
IMM GRANULOCYTES NFR BLD AUTO: 0.8 % (ref 0–0.5)
LYMPHOCYTES # BLD AUTO: 2.09 10*3/MM3 (ref 0.7–3.1)
LYMPHOCYTES NFR BLD AUTO: 14.3 % (ref 19.6–45.3)
MCH RBC QN AUTO: 30.2 PG (ref 26.6–33)
MCHC RBC AUTO-ENTMCNC: 32.8 G/DL (ref 31.5–35.7)
MCV RBC AUTO: 92 FL (ref 79–97)
MONOCYTES # BLD AUTO: 0.67 10*3/MM3 (ref 0.1–0.9)
MONOCYTES NFR BLD AUTO: 4.6 % (ref 5–12)
NEUTROPHILS # BLD AUTO: 11.59 10*3/MM3 (ref 1.7–7)
NEUTROPHILS NFR BLD AUTO: 79.5 % (ref 42.7–76)
NRBC BLD AUTO-RTO: 0 /100 WBC (ref 0–0.2)
PLATELET # BLD AUTO: 146 10*3/MM3 (ref 140–450)
PMV BLD AUTO: 10.1 FL (ref 6–12)
RBC # BLD AUTO: 3.48 10*6/MM3 (ref 3.77–5.28)
WBC NRBC COR # BLD: 14.58 10*3/MM3 (ref 3.4–10.8)

## 2019-07-04 PROCEDURE — 85025 COMPLETE CBC W/AUTO DIFF WBC: CPT | Performed by: OBSTETRICS & GYNECOLOGY

## 2019-07-04 PROCEDURE — 90791 PSYCH DIAGNOSTIC EVALUATION: CPT

## 2019-07-04 RX ADMIN — DOCUSATE SODIUM 100 MG: 100 CAPSULE, LIQUID FILLED ORAL at 07:53

## 2019-07-04 RX ADMIN — DOCUSATE SODIUM 100 MG: 100 CAPSULE, LIQUID FILLED ORAL at 22:00

## 2019-07-04 NOTE — CONSULTS
"Pt interviewed alone in Rm 309.  Spouse stepped out of room for interview.  Pt is pleasant, speech normal rate, volume.      Delivered  baby girl yesterday.  Planned pregnancy.  1st child.  Pt states feeling \"overwhelmed\" because breast feeding hasn't gone well.  Additionally, her delivery was quick yesterday, \"feel like I haven't caught up yet.\"      Rates depression as \"0\" on 1-10 scale.  Denies anxiety.  Used to cut self but stopped in , attributes stopping cutting to her Ten[employer]ism ambika.  No inpt psych.  Never a CD issue.      Attended [employer].  Doesn't plan to go to work.  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   very supportive.      No SI/HI now or past.  Never suicide intent or plan.      Declines need for any referrals.  Did some relaxation breathing with her, pt was responsive.      AC will not follow, no referrals.  D/w RN.    "

## 2019-07-04 NOTE — PROGRESS NOTES
[facility]  Vaginal Delivery Progress Note    Subjective   Postpartum Day 1: Vaginal Delivery. girl    The patient feels well without complaints.  Her pain is well controlled.  Reports normal lochia.         Objective     Vital Signs Range for the last 24 hours  Temperature: Temp:  [98.2 °F (36.8 °C)-98.9 °F (37.2 °C)] 98.6 °F (37 °C)       BP: BP: ()/(51-81) 108/69   Pulse: Heart Rate:  [] 84   Respirations: Resp:  [16-18] 16                       Physical Exam:  General: Awake and alert  Abdomen: Fundus: firm, non tender, and below umbilicus  Extremities:  trace edema, NT     Labs:     Rh Status:    RH type   Date Value Ref Range Status   2019 Positive  Final       Lab Results   Component Value Date    WBC 14.58 (H) 2019    HGB 10.5 (L) 2019    HCT 32.0 (L) 2019    MCV 92.0 2019     2019         Assessment/Plan     PPD1 S/P  -  Doing well. Routine pp care      Karen Vaughan MD  2019  11:17 AM

## 2019-07-04 NOTE — PLAN OF CARE
Problem: Patient Care Overview  Goal: Plan of Care Review  Outcome: Ongoing (interventions implemented as appropriate)   07/04/19 0601   Coping/Psychosocial   Plan of Care Reviewed With patient;spouse   Plan of Care Review   Progress improving   OTHER   Outcome Summary Routine care pp day 0. Pt voiding and oob several times. Bst feeding going well. Hand pump given for flat nipples.     Goal: Individualization and Mutuality  Outcome: Ongoing (interventions implemented as appropriate)    Goal: Discharge Needs Assessment  Outcome: Ongoing (interventions implemented as appropriate)    Goal: Interprofessional Rounds/Family Conf  Outcome: Ongoing (interventions implemented as appropriate)      Problem: Skin Injury Risk (Adult)  Goal: Identify Related Risk Factors and Signs and Symptoms  Outcome: Ongoing (interventions implemented as appropriate)    Goal: Skin Health and Integrity  Outcome: Ongoing (interventions implemented as appropriate)      Problem: Postpartum (Vaginal Delivery) (Adult,Obstetrics,Pediatric)  Goal: Signs and Symptoms of Listed Potential Problems Will be Absent, Minimized or Managed (Postpartum)  Outcome: Ongoing (interventions implemented as appropriate)

## 2019-07-04 NOTE — LACTATION NOTE
P1. Patient is sleeping after having a stressful moment . Feeling overwhelmed after a fast delivery. Baby nursed well in L&D but has been sleepy off and on today . Spoke with KENYON . He knows to call for LC tomorrow. They have a personal pump at home.

## 2019-07-05 VITALS
BODY MASS INDEX: 37.39 KG/M2 | HEART RATE: 87 BPM | DIASTOLIC BLOOD PRESSURE: 69 MMHG | WEIGHT: 219 LBS | HEIGHT: 64 IN | OXYGEN SATURATION: 98 % | SYSTOLIC BLOOD PRESSURE: 111 MMHG | RESPIRATION RATE: 16 BRPM | TEMPERATURE: 98.7 F

## 2019-07-05 RX ORDER — IBUPROFEN 800 MG/1
800 TABLET ORAL EVERY 8 HOURS PRN
Qty: 30 TABLET | Refills: 0 | Status: SHIPPED | OUTPATIENT
Start: 2019-07-05

## 2019-07-05 RX ADMIN — DOCUSATE SODIUM 100 MG: 100 CAPSULE, LIQUID FILLED ORAL at 08:20

## 2019-07-05 RX ADMIN — IBUPROFEN 800 MG: 800 TABLET, FILM COATED ORAL at 02:38

## 2019-07-05 RX ADMIN — IBUPROFEN 800 MG: 800 TABLET, FILM COATED ORAL at 12:26

## 2019-07-05 NOTE — DISCHARGE SUMMARY
Date of Discharge:  2019    Discharge Diagnosis: s/p vaginal delivery    Presenting Problem/History of Present Illness  Pregnancy [Z34.90]       Hospital Course  Patient is a 23 y.o. female presented for IOL for BPP .  Had uncomplicated .  Postpartum course was unremarkable.      Procedures Performed         Consults:   Consults     No orders found from 6/3/2019 to 7/3/2019.          Condition on Discharge:   Subjective   Postpartum Day 2 Vaginal Delivery.    The patient feels well without complaints.    Vital Signs  Temp:  [98.6 °F (37 °C)-99 °F (37.2 °C)] 98.6 °F (37 °C)  Heart Rate:  [79-87] 79  Resp:  [16] 16  BP: (105-118)/(69-76) 105/69    Physical Exam:   General: Awake and alert   Abdomen: Fundus: firm, non tender    Extremities:  Calves NT bilaterally    Assessment/Plan     PPD2  S/P  -   Stable for discharge. Instructions reviewed      Discharge Disposition  Home or Self Care    Discharge Medications     Discharge Medications      New Medications      Instructions Start Date   ibuprofen 800 MG tablet  Commonly known as:  ADVIL,MOTRIN   800 mg, Oral, Every 8 Hours PRN         Continue These Medications      Instructions Start Date   prenatal (CLASSIC) vitamin 28-0.8 MG tablet tablet   Oral, Daily               The patient has been prescribed a controlled substance.  She has been counseled on the risks associated with using the medication.   The addictive potential of this medication and alternatives were discussed carefully with this patient and she demonstrated understanding.  A SANGEETHA report has been obtained and reviewed.       Activity at Discharge: restrictions reviewed    Follow-up Appointments  No future appointments.      Test Results Pending at Discharge       Jessica Arreola MD  19  8:02 AM

## 2019-07-08 ENCOUNTER — TELEPHONE (OUTPATIENT)
Dept: LACTATION | Facility: HOSPITAL | Age: 24
End: 2019-07-08

## 2024-01-05 NOTE — PROGRESS NOTES
Pt doing very well. On 10miu of pitocin. FSR.  fhts 150s, no decels, reactive with good variability. cx 2/70/-1. Head well applied to the cervix and arom with minimal but clear fluid.  dw may have epidural prn. pnc uncomplicated. Chart reviewed. YENIFER   Cardio Dr Luis Eduardo Schaffer [phone] preop eval 12/2023, Endo Dr Conchis Feliciano  Cardio Dr Luis Eduardo Schaffer [phone] preop eval 12/2023, Endo Dr Conchis Feliciano